# Patient Record
Sex: MALE | Race: WHITE | NOT HISPANIC OR LATINO | Employment: FULL TIME | URBAN - METROPOLITAN AREA
[De-identification: names, ages, dates, MRNs, and addresses within clinical notes are randomized per-mention and may not be internally consistent; named-entity substitution may affect disease eponyms.]

---

## 2017-01-26 ENCOUNTER — APPOINTMENT (OUTPATIENT)
Dept: RADIOLOGY | Facility: MEDICAL CENTER | Age: 37
End: 2017-01-26
Attending: EMERGENCY MEDICINE
Payer: COMMERCIAL

## 2017-01-26 ENCOUNTER — HOSPITAL ENCOUNTER (EMERGENCY)
Facility: MEDICAL CENTER | Age: 37
End: 2017-01-26
Attending: EMERGENCY MEDICINE
Payer: COMMERCIAL

## 2017-01-26 VITALS
HEIGHT: 69 IN | HEART RATE: 100 BPM | RESPIRATION RATE: 16 BRPM | OXYGEN SATURATION: 100 % | TEMPERATURE: 97.7 F | BODY MASS INDEX: 27.4 KG/M2 | DIASTOLIC BLOOD PRESSURE: 86 MMHG | WEIGHT: 185 LBS | SYSTOLIC BLOOD PRESSURE: 132 MMHG

## 2017-01-26 DIAGNOSIS — N28.9 RENAL INSUFFICIENCY: ICD-10-CM

## 2017-01-26 DIAGNOSIS — N45.3 EPIDIDYMOORCHITIS: ICD-10-CM

## 2017-01-26 LAB
ALBUMIN SERPL BCP-MCNC: 4.7 G/DL (ref 3.2–4.9)
ALBUMIN/GLOB SERPL: 1.4 G/DL
ALP SERPL-CCNC: 88 U/L (ref 30–99)
ALT SERPL-CCNC: 8 U/L (ref 2–50)
ANION GAP SERPL CALC-SCNC: 15 MMOL/L (ref 0–11.9)
APTT PPP: 30.3 SEC (ref 24.7–36)
AST SERPL-CCNC: 17 U/L (ref 12–45)
BASOPHILS # BLD AUTO: 0.1 % (ref 0–1.8)
BASOPHILS # BLD: 0.01 K/UL (ref 0–0.12)
BILIRUB SERPL-MCNC: 0.6 MG/DL (ref 0.1–1.5)
BUN SERPL-MCNC: 27 MG/DL (ref 8–22)
CALCIUM SERPL-MCNC: 9.6 MG/DL (ref 8.5–10.5)
CHLORIDE SERPL-SCNC: 102 MMOL/L (ref 96–112)
CO2 SERPL-SCNC: 21 MMOL/L (ref 20–33)
CREAT SERPL-MCNC: 1.52 MG/DL (ref 0.5–1.4)
EOSINOPHIL # BLD AUTO: 0.02 K/UL (ref 0–0.51)
EOSINOPHIL NFR BLD: 0.2 % (ref 0–6.9)
ERYTHROCYTE [DISTWIDTH] IN BLOOD BY AUTOMATED COUNT: 42.1 FL (ref 35.9–50)
GFR SERPL CREATININE-BSD FRML MDRD: 52 ML/MIN/1.73 M 2
GLOBULIN SER CALC-MCNC: 3.3 G/DL (ref 1.9–3.5)
GLUCOSE SERPL-MCNC: 111 MG/DL (ref 65–99)
HCT VFR BLD AUTO: 43.1 % (ref 42–52)
HGB BLD-MCNC: 14 G/DL (ref 14–18)
IMM GRANULOCYTES # BLD AUTO: 0.05 K/UL (ref 0–0.11)
IMM GRANULOCYTES NFR BLD AUTO: 0.5 % (ref 0–0.9)
INR PPP: 1 (ref 0.87–1.13)
LYMPHOCYTES # BLD AUTO: 1.68 K/UL (ref 1–4.8)
LYMPHOCYTES NFR BLD: 15.7 % (ref 22–41)
MCH RBC QN AUTO: 27.8 PG (ref 27–33)
MCHC RBC AUTO-ENTMCNC: 32.5 G/DL (ref 33.7–35.3)
MCV RBC AUTO: 85.7 FL (ref 81.4–97.8)
MONOCYTES # BLD AUTO: 0.9 K/UL (ref 0–0.85)
MONOCYTES NFR BLD AUTO: 8.4 % (ref 0–13.4)
NEUTROPHILS # BLD AUTO: 8.05 K/UL (ref 1.82–7.42)
NEUTROPHILS NFR BLD: 75.1 % (ref 44–72)
NRBC # BLD AUTO: 0 K/UL
NRBC BLD AUTO-RTO: 0 /100 WBC
PLATELET # BLD AUTO: 406 K/UL (ref 164–446)
PMV BLD AUTO: 8.8 FL (ref 9–12.9)
POTASSIUM SERPL-SCNC: 3.9 MMOL/L (ref 3.6–5.5)
PROT SERPL-MCNC: 8 G/DL (ref 6–8.2)
PROTHROMBIN TIME: 13.5 SEC (ref 12–14.6)
RBC # BLD AUTO: 5.03 M/UL (ref 4.7–6.1)
SODIUM SERPL-SCNC: 138 MMOL/L (ref 135–145)
WBC # BLD AUTO: 10.7 K/UL (ref 4.8–10.8)

## 2017-01-26 PROCEDURE — 80053 COMPREHEN METABOLIC PANEL: CPT

## 2017-01-26 PROCEDURE — 96361 HYDRATE IV INFUSION ADD-ON: CPT

## 2017-01-26 PROCEDURE — 85730 THROMBOPLASTIN TIME PARTIAL: CPT

## 2017-01-26 PROCEDURE — A9270 NON-COVERED ITEM OR SERVICE: HCPCS | Performed by: EMERGENCY MEDICINE

## 2017-01-26 PROCEDURE — 700111 HCHG RX REV CODE 636 W/ 250 OVERRIDE (IP): Performed by: EMERGENCY MEDICINE

## 2017-01-26 PROCEDURE — 85610 PROTHROMBIN TIME: CPT

## 2017-01-26 PROCEDURE — 85025 COMPLETE CBC W/AUTO DIFF WBC: CPT

## 2017-01-26 PROCEDURE — 700102 HCHG RX REV CODE 250 W/ 637 OVERRIDE(OP): Performed by: EMERGENCY MEDICINE

## 2017-01-26 PROCEDURE — 76870 US EXAM SCROTUM: CPT

## 2017-01-26 PROCEDURE — 96365 THER/PROPH/DIAG IV INF INIT: CPT

## 2017-01-26 PROCEDURE — 99284 EMERGENCY DEPT VISIT MOD MDM: CPT

## 2017-01-26 PROCEDURE — 96375 TX/PRO/DX INJ NEW DRUG ADDON: CPT

## 2017-01-26 PROCEDURE — 700105 HCHG RX REV CODE 258: Performed by: EMERGENCY MEDICINE

## 2017-01-26 RX ORDER — OXYCODONE AND ACETAMINOPHEN 10; 325 MG/1; MG/1
1-2 TABLET ORAL EVERY 6 HOURS PRN
Qty: 30 TAB | Refills: 0 | Status: SHIPPED | OUTPATIENT
Start: 2017-01-26

## 2017-01-26 RX ORDER — CEFTRIAXONE 1 G/1
1 INJECTION, POWDER, FOR SOLUTION INTRAMUSCULAR; INTRAVENOUS ONCE
Status: COMPLETED | OUTPATIENT
Start: 2017-01-26 | End: 2017-01-26

## 2017-01-26 RX ORDER — DOXYCYCLINE HYCLATE 100 MG
100 TABLET ORAL 2 TIMES DAILY
Qty: 20 TAB | Refills: 0 | Status: SHIPPED | OUTPATIENT
Start: 2017-01-26

## 2017-01-26 RX ORDER — IBUPROFEN 800 MG/1
800 TABLET ORAL EVERY 8 HOURS PRN
Qty: 30 TAB | Refills: 0 | Status: SHIPPED | OUTPATIENT
Start: 2017-01-26

## 2017-01-26 RX ORDER — SODIUM CHLORIDE 9 MG/ML
INJECTION, SOLUTION INTRAVENOUS CONTINUOUS
Status: DISCONTINUED | OUTPATIENT
Start: 2017-01-26 | End: 2017-01-26 | Stop reason: HOSPADM

## 2017-01-26 RX ORDER — OXYCODONE AND ACETAMINOPHEN 10; 325 MG/1; MG/1
1 TABLET ORAL ONCE
Status: COMPLETED | OUTPATIENT
Start: 2017-01-26 | End: 2017-01-26

## 2017-01-26 RX ORDER — IBUPROFEN 800 MG/1
800 TABLET ORAL ONCE
Status: COMPLETED | OUTPATIENT
Start: 2017-01-26 | End: 2017-01-26

## 2017-01-26 RX ADMIN — SODIUM CHLORIDE: 9 INJECTION, SOLUTION INTRAVENOUS at 12:04

## 2017-01-26 RX ADMIN — HYDROMORPHONE HYDROCHLORIDE 1 MG: 1 INJECTION, SOLUTION INTRAMUSCULAR; INTRAVENOUS; SUBCUTANEOUS at 13:59

## 2017-01-26 RX ADMIN — OXYCODONE HYDROCHLORIDE AND ACETAMINOPHEN 1 TABLET: 10; 325 TABLET ORAL at 13:59

## 2017-01-26 RX ADMIN — IBUPROFEN 800 MG: 800 TABLET, FILM COATED ORAL at 13:59

## 2017-01-26 RX ADMIN — CEFTRIAXONE 1 G: 1 INJECTION, POWDER, FOR SOLUTION INTRAMUSCULAR; INTRAVENOUS at 12:45

## 2017-01-26 ASSESSMENT — PAIN SCALES - GENERAL: PAINLEVEL_OUTOF10: 7

## 2017-01-26 ASSESSMENT — LIFESTYLE VARIABLES: DO YOU DRINK ALCOHOL: NO

## 2017-01-26 NOTE — ED AVS SNAPSHOT
Relevance [employer] Access Code: [email or web address]  Expires: 2/25/2017  1:43 PM    Your email address is not on file at [employer].  Email Addresses are required for you to sign up for [employer], please contact [phone] to verify your personal information and to provide your email address prior to attempting to register for [employer].    Ramiro Azevedo   [address]   [place] [address]    [employer]  A secure, online tool to manage your health information     [employer]’s Relevance Media® is a secure, online tool that connects you to your personalized health information from the privacy of your home -- day or night - making it very easy for you to manage your healthcare. Once the activation process is completed, you can even access your medical information using the Relevance Media george, which is available for free in the Apple George store or Google Play store.     To learn more about Relevance [employer], visit [email or web address]    There are two levels of access available (as shown below):   My Chart Features  [facility] Primary Care Doctor [facility]  Specialists [facility]  Urgent  Care Non-[facility] Primary Care Doctor   Email your healthcare team securely and privately 24/7 X X X    Manage appointments: schedule your next appointment; view details of past/upcoming appointments X      Request prescription refills. X      View recent personal medical records, including lab and immunizations X X X X   View health record, including health history, allergies, medications X X X X   Read reports about your outpatient visits, procedures, consult and ER notes X X X X   See your discharge summary, which is a recap of your hospital and/or ER visit that includes your diagnosis, lab results, and care plan X X  X     How to register for [employer]:  Once your e-mail address has been verified, follow the following steps to sign up for [employer].     1. Go to  [email or web address]  2. Click on the Sign Up Now box, which takes you to the New Member Sign Up page. You will  need to provide the following information:  a. Enter your [employer] Access Code exactly as it appears at the top of this page. (You will not need to use this code after you’ve completed the sign-up process. If you do not sign up before the expiration date, you must request a new code.)   b. Enter your date of birth.   c. Enter your home email address.   d. Click Submit, and follow the next screen’s instructions.  3. Create a AlterPointt ID. This will be your [employer] login ID and cannot be changed, so think of one that is secure and easy to remember.  4. Create a [employer] password. You can change your password at any time.  5. Enter your Password Reset Question and Answer. This can be used at a later time if you forget your password.   6. Enter your e-mail address. This allows you to receive e-mail notifications when new information is available in [employer].  7. Click Sign Up. You can now view your health information.    For assistance activating your [employer] account, call [phone]

## 2017-01-26 NOTE — ED AVS SNAPSHOT
1/26/2017          Ramiro Azevedo   [address]   [facility] [address]    Dear Ramiro:    [facility] wants to ensure your discharge home is safe and you or your loved ones have had all your questions answered regarding your care after you leave the hospital.    You may receive a telephone call within two days of your discharge.  This call is to make certain you understand your discharge instructions as well as ensure we provided you with the best care possible during your stay with us.     The call will only last approximately 3-5 minutes and will be done by a nurse.    Once again, we want to ensure your discharge home is safe and that you have a clear understanding of any next steps in your care.  If you have any questions or concerns, please do not hesitate to contact us, we are here for you.  Thank you for choosing [facility] for your healthcare needs.    Sincerely,    Jamel Davis    [facility]

## 2017-01-26 NOTE — ED NOTES
PT updated on plan of care, pt reminded about urine sample. PT reports he is unable to provide a sample at this time

## 2017-01-26 NOTE — ED NOTES
"PT BIB EMS transferred from [place] for further care.  PT reports he has had mild testicle pain in the lt off and on since Monday.  Pain has increased 7/10.    Chief Complaint   Patient presents with   • Testicular Pain     lt      Blood pressure 132/86, pulse 82, temperature 36.5 °C (97.7 °F), resp. rate 16, height 1.753 m (5' 9\"), weight 83.915 kg (185 lb).    "

## 2017-01-26 NOTE — ED PROVIDER NOTES
"ED Provider Note    CHIEF COMPLAINT  Chief Complaint   Patient presents with   • Testicular Pain     lt        HPI  Ramiro Azevedo is a 36 y.o. male who presents for evaluation of left testicular pain.  The patient states that he had acute onset of pain in his left testicle at 3 AM this morning.  He does indicate he had some similar symptoms a couple days ago but it resolved.  The patient was seen at an [facility] hospital in [facility] and they attempted to perform testicular detorsion based on the clinical exam.  They were unable to obtain ultrasonography and the patient was transferred here for evaluation.  The patient denies: Fever, URI symptoms, cardiorespiratory symptoms, gastrointestinal symptoms.    REVIEW OF SYSTEMS  See HPI for further details.  No history of: Hypertension, diabetes, cardiac pulmonary disorders, gastrointestinal disorders, genitourinary disorders.  All other systems negative.    PAST MEDICAL HISTORY  None    FAMILY HISTORY  History reviewed. No pertinent family history.    SOCIAL HISTORY  Positive tobacco use; occasional alcohol use; no drug use;    SURGICAL HISTORY  History reviewed. No pertinent past surgical history.    CURRENT MEDICATIONS  See nurses notes    ALLERGIES  Allergies   Allergen Reactions   • Septra [Sulfamethoxazole W-Trimethoprim] Rash             PHYSICAL EXAM  VITAL SIGNS: /86 mmHg  Pulse 82  Temp(Src) 36.5 °C (97.7 °F)  Resp 16  Ht 1.753 m (5' 9\")  Wt 83.915 kg (185 lb)  BMI 27.31 kg/m2   Constitutional: A 36-year-old male, Well developed, Well nourished, appears uncomfortable but oriented ×3 HENT: Atraumatic, Bilateral external ears normal, tympanic membranes clear, Oropharynx moist, No oral exudates, Nose normal.   Eyes: PERRL, EOMI, Conjunctiva normal, No discharge.   Neck: Normal range of motion, No tenderness, Supple, No stridor.   Lymphatic: No lymphadenopathy noted.   Cardiovascular: Normal heart rate, Normal rhythm, No murmurs, No rubs, No " gallops.   Thorax & Lungs: Normal Equal breath sounds, No respiratory distress, No wheezing, no stridor, no rales. No chest tenderness.   Abdomen: Soft, nontender, nondistended, no organomegaly, positive bowel sounds normal in quality. No guarding or rebound.  Skin: Good skin turgor, pink, warm, dry. No rashes, petechiae, purpura. Normal capillary refill.   Back: No tenderness, No CVA tenderness.   Genitalia: External genitalia appear normal; the right hemiscrotum is nontender with no testicular tenderness; left hemiscrotum reveals swelling and tenderness to the testicle; negative cremasteric reflex on the left;  Extremities: Intact distal pulses, No edema, No tenderness, No cyanosis, No clubbing. Vascular: Pulses are 2+, symmetric in the upper and lower extremities.  Neurologic: Alert & oriented x 3, Normal motor function, Normal sensory function, No gross focal deficits noted.   Psychiatric: Affect normal, Judgment normal, Mood normal.     RADIOLOGY/PROCEDURES  [id number]   Final Result      Findings compatible with left epididymoorchitis.      Moderate left hydrocele.          COURSE & MEDICAL DECISION MAKING  Pertinent Labs & Imaging studies reviewed. (See chart for details)  1.  Monitor  2.  IV normal saline  3.  Zofran, titrated  4.  Dilaudid, titrated    Laboratory studies: CBC shows white count 10.7, 75% neutrophils, 15% lymphocytes, 8% monocytes, hemoglobin 14.0, crit 43.1; coags within normal limits; CMP shows an anion gap of 15, random glucose 111, BUN 27, creatinine 1.52;    Discussion/consultation: At this time, the patient presents for evaluation of acute testicular pain.  Based on the patient's history and clinical presentation, I am very concerned about the possibility of testicular torsion.  Immediately after evaluating the patient I did consult with urology(10:55am).  Forcefully, the patient did not have any evidence of testicular torsion on ultrasonography.  The patient's findings are  consistent with epididymoorchitis.  The patient was given a dose of IV ceftriaxone and will place on doxycycline.  He has some mild renal insufficiency.  At this time, the patient is stable for discharge.  I have discussed the findings and treatment plan with the patient.  He indicates he is comfortable with this explanation and disposition.    FINAL IMPRESSION  1. Epididymoorchitis    2. Renal insufficiency        PLAN  1.  Appropriate discharge instructions given  2.  Doxycycline 100 mg twice a day #20   3.  Percocet 10 mg #30  4.  Follow-up with urology  5.  Follow-up with primary care    Electronically signed by: Guy G Gansert, 1/26/2017 10:36 AM

## 2017-01-26 NOTE — ED NOTES
PT ready for discharge.  PT discharge instructions provided, pt verbalizes understanding and signed.  Pt advised to not drink or drive after taking narcotics.  PT ambulated off unit, steady gait.  All possessions with pt upon discharge.

## 2017-01-26 NOTE — ED AVS SNAPSHOT
After Visit Summary                                                                                                                Ramiro Azevedo   MRN: [id number]    Department:  [facility], Emergency Dept   Date of Visit:  1/26/2017            [facility], Emergency Dept    [address]    [place] [address]    Phone:  [phone]      You were seen by     Guy G Gansert, M.D.      Your Diagnosis Was     Epididymoorchitis     N45.3       These are the medications you received during your hospitalization from 01/26/2017 1030 to 01/26/2017 1343     Date/Time Order Dose Route Action    01/26/2017 1204 NS infusion   Intravenous New Bag    01/26/2017 1245 cefTRIAXone (ROCEPHIN) injection 1 g 1 g Intravenous Given      Follow-up Information     1. Follow up with Kel Tripp M.D..    Specialty:  Urology    Why:  1.  Medications as directed; 2.  Use scrotal support; 3.  Follow-up with urology; 4.  Follow-up with primary care regarding your renal function tests(BUN/Cteatinine)    Contact information    [address] [place] [address] [phone]        Medication Information     Review all of your home medications and newly ordered medications with your primary doctor and/or pharmacist as soon as possible. Follow medication instructions as directed by your doctor and/or pharmacist.     Please keep your complete medication list with you and share with your physician. Update the information when medications are discontinued, doses are changed, or new medications (including over-the-counter products) are added; and carry medication information at all times in the event of emergency situations.               Medication List      START taking these medications        Instructions    doxycycline 100 MG Tabs   Commonly known as:  VIBRAMYCIN    Take 1 Tab by mouth 2 times a day.   Dose:  100 mg       ibuprofen 800 MG Tabs   Commonly known as:  MOTRIN    Take 1 Tab by mouth  every 8 hours as needed (pain).   Dose:  800 mg       oxycodone-acetaminophen  MG Tabs   Commonly known as:  PERCOCET-10    Take 1-2 Tabs by mouth every 6 hours as needed for Severe Pain (pain).   Dose:  1-2 Tab               Procedures and tests performed during your visit     APTT    CBC WITH DIFFERENTIAL    COMP METABOLIC PANEL    ESTIMATED GFR    NURSING COMMUNICATION    PROTHROMBIN TIME (INR)    [id number]        Discharge Instructions       Orchitis  Orchitis is swelling (inflammation) of a testicle caused by infection. Testicles are the male organs that produce sperm. The testicles are held in a fleshy sac (scrotum) located behind the penis. Orchitis usually affects only one testicle, but it can occur in both. The condition can develop suddenly.  Orchitis can be caused by many different kinds of bacteria and viruses.  CAUSES  Orchitis can be caused by either a bacterial or viral infection.  Bacterial Infections  · These often occur along with an infection of the coiled tube that collects sperm and sits on top of the testicle (epididymis).  · In men who are not sexually active, bacterial orchitis usually starts as a urinary tract infection and spreads to the testicle.  · In sexually active men, sexually transmitted infections are the most common cause of bacterial orchitis. These can include:  ¨ Gonorrhea.  ¨ Chlamydia.  Viral Infections  · Mumps is still the most common cause of viral orchitis, though mumps is now rare in many areas because of vaccination.  · Other viruses that can cause orchitis include:  ¨ The chickenpox virus (varicella-zoster virus).  ¨ The virus that causes mononucleosis (Iris-Boucher virus).  RISK FACTORS  Boys and men who have not been vaccinated against mumps are at risk for mumps orchitis.  Risk factors for bacterial orchitis include:  · Frequent urinary tract infections.  · High-risk sexual behaviors.  · Having a sexual partner with a sexually transmitted  infection.  · Having had urinary tract surgery.  · Using a tube passed through the penis to drain urine (Tate catheter).  · An enlarged prostate gland.  SIGNS AND SYMPTOMS  The most common symptoms of orchitis are swelling and pain in the scrotum. Other signs and symptoms may include:  · Feeling generally sick (malaise).  · Fever and chills.  · Painful urination.  · Painful ejaculation.  · Blood or discharge from the penis.  · Nausea.  · Headache.  · Fatigue.  DIAGNOSIS  Your health care provider may suspect orchitis if you have a painful, swollen testicle along with other signs and symptoms of the condition. A physical exam will be done. Tests may also be done to help your health care provider make a diagnosis. These may include:  · A blood test to check for signs of infection.  · A urine test to check for a urinary tract infection.  · Using a swab to collect a fluid sample from the tip of the penis to test for sexually transmitted infections.  · Taking an image of the testicle using sound waves and a computer (testicular ultrasound).  TREATMENT  Treatment of orchitis depends on the cause. For orchitis caused by a bacterial infection, your health care provider will most likely prescribe antibiotic medicines. Bacterial infections usually clear up within a few days.  Both viral infections and bacterial infections may be treated with:  · Bed rest.  · Anti-inflammatory medicines.  · Pain medicines.  · Elevating the scrotum and applying ice.  HOME CARE INSTRUCTIONS  · Rest as directed by your health care provider.  · Take medicines only as directed by your health care provider.  · If you were prescribed an antibiotic medicine, finish it all even if you start to feel better.  · Elevate your scrotum and apply ice as directed:  ¨ Put ice in a plastic bag.  ¨ Place a small towel or pillow between your legs.  ¨ Rest your scrotum on the pillow or towel.  ¨ Place another towel between your skin and the plastic bag.  ¨ Leave  the ice on for 20 minutes, 2-3 times a day.  SEEK MEDICAL CARE IF:  · You have a fever.  · Pain and swelling have not gotten better after 3 days.  SEEK IMMEDIATE MEDICAL CARE IF:  · Your pain is getting worse.  · The swelling in your testicle gets worse.     This information is not intended to replace advice given to you by your health care provider. Make sure you discuss any questions you have with your health care provider.     Document Released: 12/15/2001 Document Revised: 01/08/2016 Document Reviewed: 05/07/2015  [employer] Interactive Patient Education ©2016 Elsevier Inc.  Epididymitis  Epididymitis is swelling (inflammation) of the epididymis. The epididymis is a cord-like structure that is located along the back part of the testicle. Epididymitis is usually, but not always, caused by infection. This is usually a sudden problem that begins with chills, fever, and pain behind the scrotum and in the testicle. There may be swelling and redness of the testicle.  CAUSES  · STDs (sexually transmitted diseases).  ¨ Gonorrhea.  ¨ Chlamydia.  · Other contagious diseases, such as tuberculosis.  · Bladder outlet obstruction.  RISK FACTORS  · Having unprotected sex.  · Having anal sex.  · Having had a prostate biopsy.  · Having had an instrument inserted into the urinary tract, such as a urinary catheter.  · Having a suppressed immune system.  DIAGNOSIS  Your health care provider may use any of the following methods to diagnose epididymitis:  · A physical exam.  · An ultrasound-guided biopsy.  · A urine test for infections, such as STDs.  Your health care provider may test you for other STDs, including HIV (human immunodeficiency virus).  TREATMENT  Antibiotic medicine may be prescribed for epididymitis that is caused by an infection. Severe cases may require surgery.  HOME CARE INSTRUCTIONS  · To help relieve pain, take hot sitz baths for 20 minutes, 4 times per day.  · If your epididymitis was caused by an STD, avoid  sexual activity until your treatment is complete.  · If you test positive for an STD, inform your sexual partners. They may need to be treated.  · Take medicines only as directed by your health care provider. These include over-the-counter medicines and prescription medicines for pain, discomfort, or fever.  · Take your antibiotic medicine as directed by your health care provider. Finish the antibiotic even if you start to feel better.  · Keep all follow-up visits as directed by your health care provider. This is important.  SEEK IMMEDIATE MEDICAL CARE IF:  · You have a fever.  · Your pain medicine is not helping.  · Your pain is getting worse.  · Your pain seems to come and go.  · You develop pain, redness, and swelling in the scrotum or the areas around it.     This information is not intended to replace advice given to you by your health care provider. Make sure you discuss any questions you have with your health care provider.     Document Released: 12/15/2001 Document Revised: 01/08/2016 Document Reviewed: 11/04/2010  [employer] Interactive Patient Education ©2016 [employer]            Patient Information     Patient Information    Following emergency treatment: all patient requiring follow-up care must return either to a private physician or a clinic if your condition worsens before you are able to obtain further medical attention, please return to the emergency room.     Billing Information    At [facility], we work to make the billing process streamlined for our patients.  Our Representatives are here to answer any questions you may have regarding your hospital bill.  If you have insurance coverage and have supplied your insurance information to us, we will submit a claim to your insurer on your behalf.  Should you have any questions regarding your bill, we can be reached online or by phone as follows:  Online: You are able pay your bills online or live chat with our representatives about any billing  questions you may have. We are here to help Monday - Friday from 8:00am to 7:30pm and 9:00am - 12:00pm on Saturdays.  Please visit [email or web address]  for more information.   Phone:  [phone] or [phone]    Please note that your emergency physician, surgeon, pathologist, radiologist, anesthesiologist, and other specialists are not employed by [facility] and will therefore bill separately for their services.  Please contact them directly for any questions concerning their bills at the numbers below:     Emergency Physician Services:  [phone]  [facility]:  [phone]  [facility]:  [phone]  [facility]:  [phone]    1. Your final bill may vary from the amount quoted upon discharge if all procedures are not complete at that time, or if your doctor has additional procedures of which we are not aware. You will receive an additional bill if you return to the Emergency Department at [facility] for suture removal regardless of the facility of which the sutures were placed.     2. Please arrange for settlement of this account at the emergency registration.    3. All self-pay accounts are due in full at the time of treatment.  If you are unable to meet this obligation then payment is expected within 4-5 days.     4. If you have had radiology studies (CT, X-ray, Ultrasound, MRI), you have received a preliminary result during your emergency department visit. Please contact the radiology department [phone] to receive a copy of your final result. Please discuss the Final result with your primary physician or with the follow up physician provided.     Crisis Hotline:  National Crisis Hotline:  9-335-XMYMHTN or 1-438.908.8723  [place] Crisis Hotline:    1-357.342.3225 or 385-552-0926         ED Discharge Follow Up Questions    1. In order to provide you with very good care, we would like to follow up with a phone call  in the next few days.  May we have your permission to contact you?     YES /  NO    2. What is the best phone number to call you? (       )_____-__________    3. What is the best time to call you?      Morning  /  Afternoon  /  Evening                   Patient Signature:  ____________________________________________________________    Date:  ____________________________________________________________

## 2017-01-26 NOTE — DISCHARGE INSTRUCTIONS
Orchitis  Orchitis is swelling (inflammation) of a testicle caused by infection. Testicles are the male organs that produce sperm. The testicles are held in a fleshy sac (scrotum) located behind the penis. Orchitis usually affects only one testicle, but it can occur in both. The condition can develop suddenly.  Orchitis can be caused by many different kinds of bacteria and viruses.  CAUSES  Orchitis can be caused by either a bacterial or viral infection.  Bacterial Infections  · These often occur along with an infection of the coiled tube that collects sperm and sits on top of the testicle (epididymis).  · In men who are not sexually active, bacterial orchitis usually starts as a urinary tract infection and spreads to the testicle.  · In sexually active men, sexually transmitted infections are the most common cause of bacterial orchitis. These can include:  ¨ Gonorrhea.  ¨ Chlamydia.  Viral Infections  · Mumps is still the most common cause of viral orchitis, though mumps is now rare in many areas because of vaccination.  · Other viruses that can cause orchitis include:  ¨ The chickenpox virus (varicella-zoster virus).  ¨ The virus that causes mononucleosis (Iris-Boucher virus).  RISK FACTORS  Boys and men who have not been vaccinated against mumps are at risk for mumps orchitis.  Risk factors for bacterial orchitis include:  · Frequent urinary tract infections.  · High-risk sexual behaviors.  · Having a sexual partner with a sexually transmitted infection.  · Having had urinary tract surgery.  · Using a tube passed through the penis to drain urine (Tate catheter).  · An enlarged prostate gland.  SIGNS AND SYMPTOMS  The most common symptoms of orchitis are swelling and pain in the scrotum. Other signs and symptoms may include:  · Feeling generally sick (malaise).  · Fever and chills.  · Painful urination.  · Painful ejaculation.  · Blood or discharge from the  penis.  · Nausea.  · Headache.  · Fatigue.  DIAGNOSIS  Your health care provider may suspect orchitis if you have a painful, swollen testicle along with other signs and symptoms of the condition. A physical exam will be done. Tests may also be done to help your health care provider make a diagnosis. These may include:  · A blood test to check for signs of infection.  · A urine test to check for a urinary tract infection.  · Using a swab to collect a fluid sample from the tip of the penis to test for sexually transmitted infections.  · Taking an image of the testicle using sound waves and a computer (testicular ultrasound).  TREATMENT  Treatment of orchitis depends on the cause. For orchitis caused by a bacterial infection, your health care provider will most likely prescribe antibiotic medicines. Bacterial infections usually clear up within a few days.  Both viral infections and bacterial infections may be treated with:  · Bed rest.  · Anti-inflammatory medicines.  · Pain medicines.  · Elevating the scrotum and applying ice.  HOME CARE INSTRUCTIONS  · Rest as directed by your health care provider.  · Take medicines only as directed by your health care provider.  · If you were prescribed an antibiotic medicine, finish it all even if you start to feel better.  · Elevate your scrotum and apply ice as directed:  ¨ Put ice in a plastic bag.  ¨ Place a small towel or pillow between your legs.  ¨ Rest your scrotum on the pillow or towel.  ¨ Place another towel between your skin and the plastic bag.  ¨ Leave the ice on for 20 minutes, 2-3 times a day.  SEEK MEDICAL CARE IF:  · You have a fever.  · Pain and swelling have not gotten better after 3 days.  SEEK IMMEDIATE MEDICAL CARE IF:  · Your pain is getting worse.  · The swelling in your testicle gets worse.     This information is not intended to replace advice given to you by your health care provider. Make sure you discuss any questions you have with your health care  provider.     Document Released: 12/15/2001 Document Revised: 01/08/2016 Document Reviewed: 05/07/2015  [employer] Patient Education ©2016 [employer]  Epididymitis  Epididymitis is swelling (inflammation) of the epididymis. The epididymis is a cord-like structure that is located along the back part of the testicle. Epididymitis is usually, but not always, caused by infection. This is usually a sudden problem that begins with chills, fever, and pain behind the scrotum and in the testicle. There may be swelling and redness of the testicle.  CAUSES  · STDs (sexually transmitted diseases).  ¨ Gonorrhea.  ¨ Chlamydia.  · Other contagious diseases, such as tuberculosis.  · Bladder outlet obstruction.  RISK FACTORS  · Having unprotected sex.  · Having anal sex.  · Having had a prostate biopsy.  · Having had an instrument inserted into the urinary tract, such as a urinary catheter.  · Having a suppressed immune system.  DIAGNOSIS  Your health care provider may use any of the following methods to diagnose epididymitis:  · A physical exam.  · An ultrasound-guided biopsy.  · A urine test for infections, such as STDs.  Your health care provider may test you for other STDs, including HIV (human immunodeficiency virus).  TREATMENT  Antibiotic medicine may be prescribed for epididymitis that is caused by an infection. Severe cases may require surgery.  HOME CARE INSTRUCTIONS  · To help relieve pain, take hot sitz baths for 20 minutes, 4 times per day.  · If your epididymitis was caused by an STD, avoid sexual activity until your treatment is complete.  · If you test positive for an STD, inform your sexual partners. They may need to be treated.  · Take medicines only as directed by your health care provider. These include over-the-counter medicines and prescription medicines for pain, discomfort, or fever.  · Take your antibiotic medicine as directed by your health care provider. Finish the antibiotic even if you start  to feel better.  · Keep all follow-up visits as directed by your health care provider. This is important.  SEEK IMMEDIATE MEDICAL CARE IF:  · You have a fever.  · Your pain medicine is not helping.  · Your pain is getting worse.  · Your pain seems to come and go.  · You develop pain, redness, and swelling in the scrotum or the areas around it.     This information is not intended to replace advice given to you by your health care provider. Make sure you discuss any questions you have with your health care provider.     Document Released: 12/15/2001 Document Revised: 01/08/2016 Document Reviewed: 11/04/2010  [employer] Patient Education ©2016 [employer]